# Patient Record
Sex: FEMALE | Race: WHITE | NOT HISPANIC OR LATINO | Employment: FULL TIME | URBAN - METROPOLITAN AREA
[De-identification: names, ages, dates, MRNs, and addresses within clinical notes are randomized per-mention and may not be internally consistent; named-entity substitution may affect disease eponyms.]

---

## 2023-12-05 DIAGNOSIS — R31.9 HEMATURIA, UNSPECIFIED: ICD-10-CM

## 2023-12-05 DIAGNOSIS — R10.9 FLANK PAIN: Primary | ICD-10-CM

## 2024-02-08 ENCOUNTER — HOSPITAL ENCOUNTER (EMERGENCY)
Facility: HOSPITAL | Age: 39
Discharge: HOME | End: 2024-02-08
Attending: STUDENT IN AN ORGANIZED HEALTH CARE EDUCATION/TRAINING PROGRAM
Payer: MEDICAID

## 2024-02-08 VITALS
HEART RATE: 85 BPM | SYSTOLIC BLOOD PRESSURE: 125 MMHG | RESPIRATION RATE: 20 BRPM | WEIGHT: 165 LBS | DIASTOLIC BLOOD PRESSURE: 79 MMHG | HEIGHT: 67 IN | BODY MASS INDEX: 25.9 KG/M2 | TEMPERATURE: 98 F | OXYGEN SATURATION: 99 %

## 2024-02-08 DIAGNOSIS — N12 PYELONEPHRITIS: Primary | ICD-10-CM

## 2024-02-08 LAB
ALBUMIN SERPL-MCNC: 3.7 G/DL (ref 3.5–5)
ALBUMIN/GLOB SERPL: 1.3 RATIO (ref 1.1–2)
ALP SERPL-CCNC: 48 UNIT/L (ref 40–150)
ALT SERPL-CCNC: 11 UNIT/L (ref 0–55)
APPEARANCE UR: ABNORMAL
AST SERPL-CCNC: 12 UNIT/L (ref 5–34)
B-HCG SERPL QL: NEGATIVE
BACTERIA #/AREA URNS AUTO: ABNORMAL /HPF
BASOPHILS # BLD AUTO: 0.06 X10(3)/MCL
BASOPHILS NFR BLD AUTO: 0.7 %
BILIRUB SERPL-MCNC: 0.3 MG/DL
BILIRUB UR QL STRIP.AUTO: NEGATIVE
BUN SERPL-MCNC: 7.7 MG/DL (ref 7–18.7)
CALCIUM SERPL-MCNC: 8.7 MG/DL (ref 8.4–10.2)
CHLORIDE SERPL-SCNC: 109 MMOL/L (ref 98–107)
CO2 SERPL-SCNC: 22 MMOL/L (ref 22–29)
COLOR UR AUTO: COLORLESS
CREAT SERPL-MCNC: 0.7 MG/DL (ref 0.55–1.02)
EOSINOPHIL # BLD AUTO: 0.07 X10(3)/MCL (ref 0–0.9)
EOSINOPHIL NFR BLD AUTO: 0.8 %
ERYTHROCYTE [DISTWIDTH] IN BLOOD BY AUTOMATED COUNT: 12.4 % (ref 11.5–17)
GFR SERPLBLD CREATININE-BSD FMLA CKD-EPI: >60 MLS/MIN/1.73/M2
GLOBULIN SER-MCNC: 2.8 GM/DL (ref 2.4–3.5)
GLUCOSE SERPL-MCNC: 99 MG/DL (ref 74–100)
GLUCOSE UR QL STRIP.AUTO: NORMAL
HCT VFR BLD AUTO: 32.3 % (ref 37–47)
HGB BLD-MCNC: 11.2 G/DL (ref 12–16)
IMM GRANULOCYTES # BLD AUTO: 0.02 X10(3)/MCL (ref 0–0.04)
IMM GRANULOCYTES NFR BLD AUTO: 0.2 %
KETONES UR QL STRIP.AUTO: NEGATIVE
LEUKOCYTE ESTERASE UR QL STRIP.AUTO: 250
LIPASE SERPL-CCNC: 38 U/L
LYMPHOCYTES # BLD AUTO: 1.97 X10(3)/MCL (ref 0.6–4.6)
LYMPHOCYTES NFR BLD AUTO: 22.5 %
MCH RBC QN AUTO: 32.5 PG (ref 27–31)
MCHC RBC AUTO-ENTMCNC: 34.7 G/DL (ref 33–36)
MCV RBC AUTO: 93.6 FL (ref 80–94)
MONOCYTES # BLD AUTO: 0.4 X10(3)/MCL (ref 0.1–1.3)
MONOCYTES NFR BLD AUTO: 4.6 %
MUCOUS THREADS URNS QL MICRO: ABNORMAL /LPF
NEUTROPHILS # BLD AUTO: 6.24 X10(3)/MCL (ref 2.1–9.2)
NEUTROPHILS NFR BLD AUTO: 71.2 %
NITRITE UR QL STRIP.AUTO: NEGATIVE
NRBC BLD AUTO-RTO: 0 %
PH UR STRIP.AUTO: 6 [PH]
PLATELET # BLD AUTO: 244 X10(3)/MCL (ref 130–400)
PMV BLD AUTO: 10 FL (ref 7.4–10.4)
POTASSIUM SERPL-SCNC: 3.4 MMOL/L (ref 3.5–5.1)
PROT SERPL-MCNC: 6.5 GM/DL (ref 6.4–8.3)
PROT UR QL STRIP.AUTO: ABNORMAL
RBC # BLD AUTO: 3.45 X10(6)/MCL (ref 4.2–5.4)
RBC #/AREA URNS AUTO: >100 /HPF
RBC UR QL AUTO: ABNORMAL
SODIUM SERPL-SCNC: 139 MMOL/L (ref 136–145)
SP GR UR STRIP.AUTO: 1.01 (ref 1–1.03)
SQUAMOUS #/AREA URNS LPF: ABNORMAL /HPF
UROBILINOGEN UR STRIP-ACNC: NORMAL
WBC # SPEC AUTO: 8.76 X10(3)/MCL (ref 4.5–11.5)
WBC #/AREA URNS AUTO: ABNORMAL /HPF

## 2024-02-08 PROCEDURE — 80053 COMPREHEN METABOLIC PANEL: CPT | Performed by: EMERGENCY MEDICINE

## 2024-02-08 PROCEDURE — 83690 ASSAY OF LIPASE: CPT | Performed by: STUDENT IN AN ORGANIZED HEALTH CARE EDUCATION/TRAINING PROGRAM

## 2024-02-08 PROCEDURE — 25000003 PHARM REV CODE 250: Performed by: STUDENT IN AN ORGANIZED HEALTH CARE EDUCATION/TRAINING PROGRAM

## 2024-02-08 PROCEDURE — 96375 TX/PRO/DX INJ NEW DRUG ADDON: CPT

## 2024-02-08 PROCEDURE — 87086 URINE CULTURE/COLONY COUNT: CPT | Performed by: EMERGENCY MEDICINE

## 2024-02-08 PROCEDURE — 81001 URINALYSIS AUTO W/SCOPE: CPT | Performed by: EMERGENCY MEDICINE

## 2024-02-08 PROCEDURE — 63600175 PHARM REV CODE 636 W HCPCS: Performed by: STUDENT IN AN ORGANIZED HEALTH CARE EDUCATION/TRAINING PROGRAM

## 2024-02-08 PROCEDURE — 99285 EMERGENCY DEPT VISIT HI MDM: CPT | Mod: 25

## 2024-02-08 PROCEDURE — 81025 URINE PREGNANCY TEST: CPT | Performed by: EMERGENCY MEDICINE

## 2024-02-08 PROCEDURE — 85025 COMPLETE CBC W/AUTO DIFF WBC: CPT | Performed by: EMERGENCY MEDICINE

## 2024-02-08 PROCEDURE — 96365 THER/PROPH/DIAG IV INF INIT: CPT

## 2024-02-08 PROCEDURE — 96361 HYDRATE IV INFUSION ADD-ON: CPT

## 2024-02-08 RX ORDER — KETOROLAC TROMETHAMINE 10 MG/1
10 TABLET, FILM COATED ORAL EVERY 6 HOURS
Qty: 16 TABLET | Refills: 0 | Status: SHIPPED | OUTPATIENT
Start: 2024-02-08 | End: 2024-02-12

## 2024-02-08 RX ORDER — CEFPODOXIME PROXETIL 200 MG/1
200 TABLET, FILM COATED ORAL EVERY 12 HOURS
Qty: 28 TABLET | Refills: 0 | Status: SHIPPED | OUTPATIENT
Start: 2024-02-08 | End: 2024-02-22

## 2024-02-08 RX ORDER — KETOROLAC TROMETHAMINE 30 MG/ML
15 INJECTION, SOLUTION INTRAMUSCULAR; INTRAVENOUS
Status: COMPLETED | OUTPATIENT
Start: 2024-02-08 | End: 2024-02-08

## 2024-02-08 RX ORDER — ONDANSETRON 4 MG/1
4 TABLET, ORALLY DISINTEGRATING ORAL EVERY 8 HOURS PRN
Qty: 12 TABLET | Refills: 0 | Status: SHIPPED | OUTPATIENT
Start: 2024-02-08

## 2024-02-08 RX ORDER — KETOROLAC TROMETHAMINE 10 MG/1
10 TABLET, FILM COATED ORAL EVERY 6 HOURS
Qty: 16 TABLET | Refills: 0 | Status: SHIPPED | OUTPATIENT
Start: 2024-02-08 | End: 2024-02-08

## 2024-02-08 RX ORDER — KETOROLAC TROMETHAMINE 30 MG/ML
15 INJECTION, SOLUTION INTRAMUSCULAR; INTRAVENOUS
Status: DISCONTINUED | OUTPATIENT
Start: 2024-02-08 | End: 2024-02-08

## 2024-02-08 RX ADMIN — SODIUM CHLORIDE, POTASSIUM CHLORIDE, SODIUM LACTATE AND CALCIUM CHLORIDE 1000 ML: 600; 310; 30; 20 INJECTION, SOLUTION INTRAVENOUS at 02:02

## 2024-02-08 RX ADMIN — KETOROLAC TROMETHAMINE 15 MG: 30 INJECTION, SOLUTION INTRAMUSCULAR; INTRAVENOUS at 02:02

## 2024-02-08 RX ADMIN — CEFTRIAXONE SODIUM 1 G: 1 INJECTION, POWDER, FOR SOLUTION INTRAMUSCULAR; INTRAVENOUS at 06:02

## 2024-02-08 NOTE — ED PROVIDER NOTES
Encounter Date: 2/8/2024    SCRIBE #1 NOTE: I, Chelsey Grayson, am scribing for, and in the presence of,  Nikolay Ying MD. I have scribed the following portions of the note - Other sections scribed: HPI, ROS, PE.       History     Chief Complaint   Patient presents with    Flank Pain     C/o right flank pain radiating around back, woke her up from sleep. States past 2 days had some similar pain on left. Denies NVD, fever, urinary complaint. LMP 4 days ago     38 year old female with history of depression and a caesarian section presents to the ED with complaints of right flank pain. Pt reports that she does not remember if her left or right flank was hurting 2 days ago, but she woke up one hour ago with pain in her right flank. She states that over the weekend, she was having diarrhea and dysuria, both of which are now resolved. She complains of nausea and denies symptoms of vomiting, fever, chills, and hematuria. Pt denies history of kidney stones.     The history is provided by the patient. No  was used.     Review of patient's allergies indicates:  No Known Allergies  No past medical history on file.  No past surgical history on file.  No family history on file.     Review of Systems   Constitutional:  Positive for chills and fever.   Gastrointestinal:  Positive for abdominal pain, diarrhea (resolved) and nausea. Negative for vomiting.   Genitourinary:  Negative for hematuria.   All other systems reviewed and are negative.      Physical Exam     Initial Vitals [02/08/24 0101]   BP Pulse Resp Temp SpO2   106/69 88 15 98.5 °F (36.9 °C) 99 %      MAP       --         Physical Exam    Nursing note and vitals reviewed.  Constitutional: She appears well-developed and well-nourished. She is not diaphoretic. No distress.    Patient resting comfortably in stretcher.  NAD.  No respiratory distress   HENT:   Head: Normocephalic and atraumatic.   Right Ear: External ear normal.   Left Ear: External ear  normal.   Nose: Nose normal.   Eyes: Conjunctivae and EOM are normal. Pupils are equal, round, and reactive to light. Right eye exhibits no discharge. Left eye exhibits no discharge.   Cardiovascular:  Normal rate, regular rhythm, normal heart sounds and intact distal pulses.     Exam reveals no gallop and no friction rub.       No murmur heard.  Pulmonary/Chest: Breath sounds normal. No respiratory distress. She has no wheezes. She has no rhonchi. She has no rales. She exhibits no tenderness.   Abdominal: Abdomen is soft. Bowel sounds are normal. She exhibits no distension and no mass. There is no abdominal tenderness.   There is right CVA tenderness.There is no rebound, no guarding and negative Ridley's sign.   Musculoskeletal:         General: No edema. Normal range of motion.     Neurological: She is alert and oriented to person, place, and time. No cranial nerve deficit or sensory deficit.   Skin: Skin is warm and dry. Capillary refill takes less than 2 seconds. No erythema. No pallor.         ED Course   Procedures  Labs Reviewed   COMPREHENSIVE METABOLIC PANEL - Abnormal; Notable for the following components:       Result Value    Potassium Level 3.4 (*)     Chloride 109 (*)     All other components within normal limits   URINALYSIS, REFLEX TO URINE CULTURE - Abnormal; Notable for the following components:    Appearance, UA Turbid (*)     Protein, UA 1+ (*)     Blood, UA 3+ (*)     Leukocyte Esterase,  (*)     WBC, UA 21-50 (*)     Bacteria, UA Moderate (*)     Squamous Epithelial Cells, UA Few (*)     Mucous, UA Trace (*)     RBC, UA >100 (*)     All other components within normal limits   CBC WITH DIFFERENTIAL - Abnormal; Notable for the following components:    RBC 3.45 (*)     Hgb 11.2 (*)     Hct 32.3 (*)     MCH 32.5 (*)     All other components within normal limits   PREGNANCY TEST, URINE RAPID - Normal   LIPASE - Normal   CULTURE, URINE   CBC W/ AUTO DIFFERENTIAL    Narrative:     The following  orders were created for panel order CBC auto differential.  Procedure                               Abnormality         Status                     ---------                               -----------         ------                     CBC with Differential[[id number]]       Abnormal            Final result                 Please view results for these tests on the individual orders.          Imaging Results              CT Renal Stone Study ABD Pelvis WO (Preliminary result)  Result time 02/08/24 05:20:11      Preliminary result by Krzysztof Crocker Jr., MD (02/08/24 05:20:11)                   Narrative:    START OF REPORT:  Technique: CT of the abdomen and pelvis was performed with axial images as well as sagittal and coronal reconstruction images without intravenous contrast renal stone protocol.    Comparison: None available.    Clinical History: Rt flank pain.    Dosage Information: Automated Exposure Control was utilized.    Findings:  Lines and Tubes: None.  Thorax:  Lungs: Minimal streaky opacity is present at the visualized lung bases, consistent with scarring and atelectasis.  Heart: The heart size is within normal limits.  Abdomen:  Abdominal Wall: No abdominal wall pathology is seen.  Liver: The liver appears unremarkable.  Biliary System: No intrahepatic or extrahepatic biliary duct dilatation is seen.  Gallbladder: The gallbladder appears unremarkable.  Pancreas: Mild pancreatic atrophy is seen.  Spleen: Mildly enlarged spleen.  Adrenals: The adrenal glands appear unremarkable.  Kidneys: There is mild urothelial thickening and periureteric fat stranding bilaterally. No calculus is identified in the bilateral ureters. This may be reflective of pyelo ureteritis.  Aorta: The abdominal aorta appears unremarkable.  Bowel:  Esophagus: The visualized esophagus appears unremarkable.  Stomach: The stomach appears unremarkable.  Duodenum: Unremarkable appearing duodenum.  Small Bowel: The small bowel appears  unremarkable.  Colon: Nondistended.  Appendix: The appendix appears unremarkable and is seen on Image 109, Series 2 through Image 118, Series 2.  Peritoneum: No intraperitoneal free air or ascites is seen.    Pelvis:  Bladder: The bladder appears unremarkable.  Female:  Uterus: Surgical clips and sutures are identified in the uterine fundus.  Ovaries: No adnexal masses are seen.    Bony structures:  Dorsal Spine: The visualized dorsal spine appears unremarkable.  Bony Pelvis: The visualized bony structures of the pelvis appear unremarkable.      Impression:  1. There is mild urothelial thickening and periureteric fat stranding bilaterally. No calculus is identified in the bilateral ureters. This may be reflective of pyelo ureteritis. Correlate with clinical and laboratory findings as regards additional evaluation and follow-up to full resolution as indicated.  2. Details and other findings as discussed above.                                         Medications   cefTRIAXone (Rocephin) 1 g in dextrose 5 % in water (D5W) 100 mL IVPB (MB+) (has no administration in time range)   ketorolac injection 15 mg (15 mg Intravenous Given 2/8/24 0200)   lactated ringers bolus 1,000 mL (1,000 mLs Intravenous New Bag 2/8/24 0244)     Medical Decision Making      Differential diagnosis includes but is not limited to appendicitis, biliary disease, diverticulitis,  AAA, ACS, mesenteric ischemia, intraabdominal abcess, retroperitoneal abcess, gastritis, gastroenteritis, hepatitis, hernia, pancreatitis, inflammatory bowel disease, PUD, SBP, nephrolithiasis, DKA,  consitpation, GERD, IBS     Patient awake alert well-appearing.  Mild suprapubic tenderness.  Positive CVA tenderness.  Abdomen is otherwise nontender to palpation.  Urinalysis consistent with UTI.  CT scan does not reveal any kidney stone but does reveal some stranding but kidneys bilaterally and the ureters.  This would explain while patient had pain on the left side earlier  but now has pain in the right side.  Resting comfortably on re-evaluation after Toradol.  No significant leukocytosis.  We will give Rocephin here in the emergency department.  Discharge with 3rd generation cephalosporin p.o..  Some Toradol and Zofran as well.  Discussed these findings with the patient, plan.  She was comfortable with this. Return precautions given.  Questions invited, questions answered to the best my ability.  Patient discharged home condition stable.          Amount and/or Complexity of Data Reviewed  External Data Reviewed: notes.     Details: Per chart review patient had visit with PCP 12/21/2023 complaining of the time of flank pain, hematuria. Ultrasound of kidneys showed no hydronephrosis in his subcentimeter simple left renal cyst.  Labs: ordered. Decision-making details documented in ED Course.  Radiology: ordered and independent interpretation performed.     Details:   Stranding around the kidneys.  No obvious stone    Risk  OTC drugs.  Prescription drug management.              Attending Attestation:           Physician Attestation for Scribe:  Physician Attestation Statement for Scribe #1: I, Nikolay Ying MD, reviewed documentation, as scribed by Chelsey Grayson in my presence, and it is both accurate and complete.             ED Course as of 02/08/24 0608   Thu Feb 08, 2024   0112 Per chart review patient had visit with PCP 12/21/2023 complaining of the time of flank pain, hematuria.  Ultrasound of kidneys showed no hydronephrosis in his subcentimeter simple left renal cyst. [MM]   0227 CBC auto differential(!)  Anemia no leukocytosis. [MM]   0236 Lipase: 38 [MM]   0236 Comprehensive Metabolic Panel(!)    no significant electrolyte abnormality or renal dysfunction. [MM]   0410 Urinalysis, Reflex to Urine Culture(!)    Positive for bacteria, WBCs.  RBCs greater than 100. [MM]   0410 hCG Qualitative, Urine: Negative [MM]      ED Course User Index  [MM] Nikolay Ying MD                            Clinical Impression:  Final diagnoses:  [N12] Pyelonephritis (Primary)          ED Disposition Condition    Discharge Stable          ED Prescriptions       Medication Sig Dispense Start Date End Date Auth. Provider    cefpodoxime (VANTIN) 200 MG tablet Take 1 tablet (200 mg total) by mouth every 12 (twelve) hours. for 14 days 28 tablet 2/8/2024 2/22/2024 Nikolay Ying MD    ketorolac (TORADOL) 10 mg tablet Take 1 tablet (10 mg total) by mouth every 6 (six) hours. for 4 days 16 tablet 2/8/2024 2/12/2024 Nikolay Ying MD    ondansetron (ZOFRAN-ODT) 4 MG TbDL Take 1 tablet (4 mg total) by mouth every 8 (eight) hours as needed (nausea vomiting). 12 tablet 2/8/2024 -- Nikolay Ying MD          Follow-up Information       Follow up With Specialties Details Why Contact Info    Nelli Briggs FNP Nurse Practitioner Call   [address] [place] [id number]  [phone]               Nikolay Ying MD  02/08/24 0608

## 2024-02-08 NOTE — DISCHARGE INSTRUCTIONS
Thanks for letting us take care of you today!  It is our goal to give you courteous care and to keep you comfortable and informed, if you have any questions before you leave I will be happy to try and answer them.    Here is some advice after your visit:    Your visit in the emergency department is NOT definitive care - please follow-up with your primary care doctor and/or specialist within 1-2 days. Please return to the emergency department if you develop worsening symptoms including: fever, chills, chest pain, shortness of breath, weakness, numbness, tingling, nausea, vomiting, inability to eat, drink, or take your medication. Please return if you have any worsening in your condition or if you have any other concerns.    If you had radiology exams like an XRAY or CT in the emergency Department the interpreation on them may be preliminary - there may be less time sensitive findings on the reports please obtain these reports within 24 hours from the hospital or by using your out on your mobile phone to access records.  Bring these to your primary care doctor and/or specialist for further review of incidental findings.    Please review any LAB WORK from your visit today with your primary care physician.    You have been prescribed ketorolac/Toradol.  If you are taking his medication please do not take any additional NSAIDs including ibuprofen, naproxen, indomethacin.  Please stay hydrated when taking this medication.      Please take all antibiotics as prescribed     Please follow up with the primary care physician.      Please return emergency department if you have any worsening symptoms nausea vomiting inability to eat, drink, take your medications worsening fever or chills.

## 2024-02-08 NOTE — Clinical Note
"Deonna Nelson" Oleg was seen and treated in our emergency department on 2/8/2024.  She may return to work on 02/12/2024.       If you have any questions or concerns, please don't hesitate to call.       RN    "

## 2024-02-10 LAB — BACTERIA UR CULT: ABNORMAL
